# Patient Record
Sex: MALE | Employment: OTHER | URBAN - METROPOLITAN AREA
[De-identification: names, ages, dates, MRNs, and addresses within clinical notes are randomized per-mention and may not be internally consistent; named-entity substitution may affect disease eponyms.]

---

## 2024-08-06 ENCOUNTER — HOSPITAL ENCOUNTER (INPATIENT)
Age: 89
LOS: 1 days | Discharge: NURSING, REHAB OR LONG-TERM CARE | End: 2024-08-07
Attending: EMERGENCY MEDICINE | Admitting: SURGERY
Payer: MEDICARE

## 2024-08-06 ENCOUNTER — APPOINTMENT (OUTPATIENT)
Dept: CT IMAGING | Age: 89
End: 2024-08-06
Payer: MEDICARE

## 2024-08-06 ENCOUNTER — APPOINTMENT (OUTPATIENT)
Age: 89
End: 2024-08-06
Payer: MEDICARE

## 2024-08-06 DIAGNOSIS — I60.9 SUBARACHNOID HEMORRHAGE (HCC): ICD-10-CM

## 2024-08-06 DIAGNOSIS — I71.43 INFRARENAL ABDOMINAL AORTIC ANEURYSM (AAA) WITHOUT RUPTURE (HCC): ICD-10-CM

## 2024-08-06 DIAGNOSIS — I61.9 INTRAPARENCHYMAL HEMORRHAGE OF BRAIN (HCC): Primary | ICD-10-CM

## 2024-08-06 LAB
25(OH)D3 SERPL-MCNC: 12.1 NG/ML (ref 30–100)
ABO + RH BLD: NORMAL
ALBUMIN SERPL-MCNC: 4 G/DL (ref 3.5–5.2)
ANION GAP SERPL CALCULATED.3IONS-SCNC: 10 MMOL/L (ref 9–16)
ARM BAND NUMBER: NORMAL
BASOPHILS # BLD: 0.05 K/UL (ref 0–0.2)
BASOPHILS NFR BLD: 1 % (ref 0–2)
BLOOD BANK SAMPLE EXPIRATION: NORMAL
BLOOD BANK SPECIMEN: ABNORMAL
BLOOD GROUP ANTIBODIES SERPL: NEGATIVE
BODY TEMPERATURE: 37
BUN SERPL-MCNC: 20 MG/DL (ref 8–23)
CHLORIDE SERPL-SCNC: 100 MMOL/L (ref 98–107)
CK SERPL-CCNC: 92 U/L (ref 39–308)
CO2 SERPL-SCNC: 22 MMOL/L (ref 20–31)
COHGB MFR BLD: 2 % (ref 0–5)
CREAT SERPL-MCNC: 1.4 MG/DL (ref 0.7–1.2)
EOSINOPHIL # BLD: 0.22 K/UL (ref 0–0.44)
EOSINOPHILS RELATIVE PERCENT: 2 % (ref 1–4)
ERYTHROCYTE [DISTWIDTH] IN BLOOD BY AUTOMATED COUNT: 14.2 % (ref 11.8–14.4)
ERYTHROCYTE [DISTWIDTH] IN BLOOD BY AUTOMATED COUNT: 14.3 % (ref 11.8–14.4)
EST. AVERAGE GLUCOSE BLD GHB EST-MCNC: 108 MG/DL
ETHANOL PERCENT: <0.01 %
ETHANOLAMINE SERPL-MCNC: <10 MG/DL (ref 0–0.08)
FIBRINOGEN, FUNCTIONAL TEG: <4 MM (ref 15–32)
FIO2 ON VENT: ABNORMAL %
GFR, ESTIMATED: 48 ML/MIN/1.73M2
GLUCOSE SERPL-MCNC: 109 MG/DL (ref 74–99)
HBA1C MFR BLD: 5.4 % (ref 4–6)
HCO3 VENOUS: 23 MMOL/L (ref 24–30)
HCT VFR BLD AUTO: 36.2 % (ref 40.7–50.3)
HCT VFR BLD AUTO: 36.9 % (ref 40.7–50.3)
HGB BLD-MCNC: 10.7 G/DL (ref 13–17)
HGB BLD-MCNC: 11.7 G/DL (ref 13–17)
IMM GRANULOCYTES # BLD AUTO: 0.03 K/UL (ref 0–0.3)
IMM GRANULOCYTES NFR BLD: 0 %
INR PPP: 1
LY30 (LYSIS) TEG: 0.1 % (ref 0–2.6)
LYMPHOCYTES NFR BLD: 1.35 K/UL (ref 1.1–3.7)
LYMPHOCYTES RELATIVE PERCENT: 15 % (ref 24–43)
MA(MAX CLOT) RAPID TEG: <40 MM (ref 52–70)
MCH RBC QN AUTO: 28.8 PG (ref 25.2–33.5)
MCH RBC QN AUTO: 29 PG (ref 25.2–33.5)
MCHC RBC AUTO-ENTMCNC: 29.6 G/DL (ref 28.4–34.8)
MCHC RBC AUTO-ENTMCNC: 31.7 G/DL (ref 28.4–34.8)
MCV RBC AUTO: 90.9 FL (ref 82.6–102.9)
MCV RBC AUTO: 98.1 FL (ref 82.6–102.9)
MONOCYTES NFR BLD: 0.7 K/UL (ref 0.1–1.2)
MONOCYTES NFR BLD: 8 % (ref 3–12)
MYOGLOBIN SERPL-MCNC: 109 NG/ML (ref 28–72)
NEGATIVE BASE EXCESS, VEN: 1.4 MMOL/L (ref 0–2)
NEUTROPHILS NFR BLD: 75 % (ref 36–65)
NEUTS SEG NFR BLD: 6.95 K/UL (ref 1.5–8.1)
NRBC BLD-RTO: 0 PER 100 WBC
NRBC BLD-RTO: 0 PER 100 WBC
O2 SAT, VEN: 77.8 % (ref 60–85)
PARTIAL THROMBOPLASTIN TIME: 27.5 SEC (ref 23–36.5)
PCO2 VENOUS: 40.1 MM HG (ref 39–55)
PH VENOUS: 7.38 (ref 7.32–7.42)
PLATELET # BLD AUTO: 159 K/UL (ref 138–453)
PLATELET # BLD AUTO: 177 K/UL (ref 138–453)
PMV BLD AUTO: 8.8 FL (ref 8.1–13.5)
PMV BLD AUTO: 8.9 FL (ref 8.1–13.5)
PO2 VENOUS: 41.4 MM HG (ref 30–50)
POTASSIUM SERPL-SCNC: 5 MMOL/L (ref 3.7–5.3)
PROTHROMBIN TIME: 13.2 SEC (ref 11.7–14.9)
RBC # BLD AUTO: 3.69 M/UL (ref 4.21–5.77)
RBC # BLD AUTO: 4.06 M/UL (ref 4.21–5.77)
REACTION TIME TEG: 7.3 MIN (ref 4.6–9.1)
SODIUM SERPL-SCNC: 132 MMOL/L (ref 136–145)
T4 FREE SERPL-MCNC: 1.4 NG/DL (ref 0.92–1.68)
TROPONIN I SERPL HS-MCNC: 34 NG/L (ref 0–22)
TROPONIN I SERPL HS-MCNC: 39 NG/L (ref 0–22)
TSH SERPL DL<=0.05 MIU/L-ACNC: 1.64 UIU/ML (ref 0.27–4.2)
VIT B12 SERPL-MCNC: 415 PG/ML (ref 232–1245)
WBC OTHER # BLD: 10.6 K/UL (ref 3.5–11.3)
WBC OTHER # BLD: 9.3 K/UL (ref 3.5–11.3)

## 2024-08-06 PROCEDURE — 99222 1ST HOSP IP/OBS MODERATE 55: CPT | Performed by: SURGERY

## 2024-08-06 PROCEDURE — 83036 HEMOGLOBIN GLYCOSYLATED A1C: CPT

## 2024-08-06 PROCEDURE — 85027 COMPLETE CBC AUTOMATED: CPT

## 2024-08-06 PROCEDURE — 6370000000 HC RX 637 (ALT 250 FOR IP): Performed by: NURSE PRACTITIONER

## 2024-08-06 PROCEDURE — 2000000000 HC ICU R&B

## 2024-08-06 PROCEDURE — 71260 CT THORAX DX C+: CPT

## 2024-08-06 PROCEDURE — 82947 ASSAY GLUCOSE BLOOD QUANT: CPT

## 2024-08-06 PROCEDURE — 70450 CT HEAD/BRAIN W/O DYE: CPT

## 2024-08-06 PROCEDURE — 6360000002 HC RX W HCPCS: Performed by: STUDENT IN AN ORGANIZED HEALTH CARE EDUCATION/TRAINING PROGRAM

## 2024-08-06 PROCEDURE — 84443 ASSAY THYROID STIM HORMONE: CPT

## 2024-08-06 PROCEDURE — 6360000004 HC RX CONTRAST MEDICATION

## 2024-08-06 PROCEDURE — 85390 FIBRINOLYSINS SCREEN I&R: CPT

## 2024-08-06 PROCEDURE — 36430 TRANSFUSION BLD/BLD COMPNT: CPT

## 2024-08-06 PROCEDURE — 82306 VITAMIN D 25 HYDROXY: CPT

## 2024-08-06 PROCEDURE — 85384 FIBRINOGEN ACTIVITY: CPT

## 2024-08-06 PROCEDURE — 84439 ASSAY OF FREE THYROXINE: CPT

## 2024-08-06 PROCEDURE — 86901 BLOOD TYPING SEROLOGIC RH(D): CPT

## 2024-08-06 PROCEDURE — 86850 RBC ANTIBODY SCREEN: CPT

## 2024-08-06 PROCEDURE — 82805 BLOOD GASES W/O2 SATURATION: CPT

## 2024-08-06 PROCEDURE — P9073 PLATELETS PHERESIS PATH REDU: HCPCS

## 2024-08-06 PROCEDURE — APPSS45 APP SPLIT SHARED TIME 31-45 MINUTES: Performed by: REGISTERED NURSE

## 2024-08-06 PROCEDURE — 85576 BLOOD PLATELET AGGREGATION: CPT

## 2024-08-06 PROCEDURE — 82565 ASSAY OF CREATININE: CPT

## 2024-08-06 PROCEDURE — 84484 ASSAY OF TROPONIN QUANT: CPT

## 2024-08-06 PROCEDURE — 85610 PROTHROMBIN TIME: CPT

## 2024-08-06 PROCEDURE — 85025 COMPLETE CBC W/AUTO DIFF WBC: CPT

## 2024-08-06 PROCEDURE — 82607 VITAMIN B-12: CPT

## 2024-08-06 PROCEDURE — 85347 COAGULATION TIME ACTIVATED: CPT

## 2024-08-06 PROCEDURE — 2580000003 HC RX 258: Performed by: STUDENT IN AN ORGANIZED HEALTH CARE EDUCATION/TRAINING PROGRAM

## 2024-08-06 PROCEDURE — 84520 ASSAY OF UREA NITROGEN: CPT

## 2024-08-06 PROCEDURE — 86900 BLOOD TYPING SEROLOGIC ABO: CPT

## 2024-08-06 PROCEDURE — G0480 DRUG TEST DEF 1-7 CLASSES: HCPCS

## 2024-08-06 PROCEDURE — 84703 CHORIONIC GONADOTROPIN ASSAY: CPT

## 2024-08-06 PROCEDURE — 6370000000 HC RX 637 (ALT 250 FOR IP): Performed by: STUDENT IN AN ORGANIZED HEALTH CARE EDUCATION/TRAINING PROGRAM

## 2024-08-06 PROCEDURE — 94761 N-INVAS EAR/PLS OXIMETRY MLT: CPT

## 2024-08-06 PROCEDURE — 36415 COLL VENOUS BLD VENIPUNCTURE: CPT

## 2024-08-06 PROCEDURE — 85730 THROMBOPLASTIN TIME PARTIAL: CPT

## 2024-08-06 PROCEDURE — 80051 ELECTROLYTE PANEL: CPT

## 2024-08-06 PROCEDURE — 82040 ASSAY OF SERUM ALBUMIN: CPT

## 2024-08-06 PROCEDURE — 82550 ASSAY OF CK (CPK): CPT

## 2024-08-06 PROCEDURE — 99285 EMERGENCY DEPT VISIT HI MDM: CPT

## 2024-08-06 PROCEDURE — 83874 ASSAY OF MYOGLOBIN: CPT

## 2024-08-06 RX ORDER — FERROUS SULFATE 325(65) MG
325 TABLET ORAL
COMMUNITY

## 2024-08-06 RX ORDER — SODIUM CHLORIDE 9 MG/ML
INJECTION, SOLUTION INTRAVENOUS PRN
Status: DISCONTINUED | OUTPATIENT
Start: 2024-08-06 | End: 2024-08-07 | Stop reason: HOSPADM

## 2024-08-06 RX ORDER — SODIUM CHLORIDE 9 MG/ML
INJECTION, SOLUTION INTRAVENOUS CONTINUOUS
Status: DISCONTINUED | OUTPATIENT
Start: 2024-08-06 | End: 2024-08-06

## 2024-08-06 RX ORDER — BACILLUS COAGULANS 1B CELL
500 CAPSULE ORAL DAILY
COMMUNITY

## 2024-08-06 RX ORDER — NYSTATIN 100000 U/G
CREAM TOPICAL 2 TIMES DAILY
COMMUNITY

## 2024-08-06 RX ORDER — SODIUM CHLORIDE 0.9 % (FLUSH) 0.9 %
5-40 SYRINGE (ML) INJECTION EVERY 12 HOURS SCHEDULED
Status: DISCONTINUED | OUTPATIENT
Start: 2024-08-06 | End: 2024-08-07 | Stop reason: HOSPADM

## 2024-08-06 RX ORDER — ACETAMINOPHEN 500 MG
1000 TABLET ORAL EVERY 8 HOURS SCHEDULED
Status: DISCONTINUED | OUTPATIENT
Start: 2024-08-06 | End: 2024-08-07 | Stop reason: HOSPADM

## 2024-08-06 RX ORDER — POTASSIUM CHLORIDE 29.8 MG/ML
20 INJECTION INTRAVENOUS PRN
Status: DISCONTINUED | OUTPATIENT
Start: 2024-08-06 | End: 2024-08-07 | Stop reason: HOSPADM

## 2024-08-06 RX ORDER — POTASSIUM CHLORIDE 7.45 MG/ML
10 INJECTION INTRAVENOUS PRN
Status: DISCONTINUED | OUTPATIENT
Start: 2024-08-06 | End: 2024-08-07 | Stop reason: HOSPADM

## 2024-08-06 RX ORDER — ACETAMINOPHEN 500 MG
1000 TABLET ORAL EVERY 6 HOURS PRN
COMMUNITY

## 2024-08-06 RX ORDER — TROSPIUM CHLORIDE 20 MG/1
20 TABLET, FILM COATED ORAL NIGHTLY
Status: DISCONTINUED | OUTPATIENT
Start: 2024-08-06 | End: 2024-08-07 | Stop reason: HOSPADM

## 2024-08-06 RX ORDER — POLYETHYLENE GLYCOL 3350 17 G/17G
17 POWDER, FOR SOLUTION ORAL DAILY
Status: DISCONTINUED | OUTPATIENT
Start: 2024-08-06 | End: 2024-08-07 | Stop reason: HOSPADM

## 2024-08-06 RX ORDER — ONDANSETRON 2 MG/ML
4 INJECTION INTRAMUSCULAR; INTRAVENOUS EVERY 6 HOURS PRN
Status: DISCONTINUED | OUTPATIENT
Start: 2024-08-06 | End: 2024-08-07 | Stop reason: HOSPADM

## 2024-08-06 RX ORDER — TOLTERODINE 4 MG/1
4 CAPSULE, EXTENDED RELEASE ORAL DAILY
COMMUNITY

## 2024-08-06 RX ORDER — MEMANTINE HYDROCHLORIDE 5 MG/1
5 TABLET ORAL DAILY
COMMUNITY
Start: 2024-06-07

## 2024-08-06 RX ORDER — SODIUM CHLORIDE 0.9 % (FLUSH) 0.9 %
5-40 SYRINGE (ML) INJECTION PRN
Status: DISCONTINUED | OUTPATIENT
Start: 2024-08-06 | End: 2024-08-07 | Stop reason: HOSPADM

## 2024-08-06 RX ORDER — ATORVASTATIN CALCIUM 20 MG/1
20 TABLET, FILM COATED ORAL DAILY
Status: DISCONTINUED | OUTPATIENT
Start: 2024-08-06 | End: 2024-08-07 | Stop reason: HOSPADM

## 2024-08-06 RX ORDER — LORATADINE 10 MG/1
10 TABLET ORAL DAILY
COMMUNITY

## 2024-08-06 RX ORDER — LISINOPRIL 10 MG/1
10 TABLET ORAL DAILY
COMMUNITY

## 2024-08-06 RX ORDER — LANOLIN ALCOHOL/MO/W.PET/CERES
250 CREAM (GRAM) TOPICAL NIGHTLY
COMMUNITY

## 2024-08-06 RX ORDER — ATORVASTATIN CALCIUM 20 MG/1
20 TABLET, FILM COATED ORAL DAILY
COMMUNITY

## 2024-08-06 RX ORDER — CLOPIDOGREL BISULFATE 75 MG/1
75 TABLET ORAL DAILY
Status: ON HOLD | COMMUNITY
End: 2024-08-07 | Stop reason: HOSPADM

## 2024-08-06 RX ORDER — SENNA AND DOCUSATE SODIUM 50; 8.6 MG/1; MG/1
1 TABLET, FILM COATED ORAL 2 TIMES DAILY
Status: DISCONTINUED | OUTPATIENT
Start: 2024-08-06 | End: 2024-08-07 | Stop reason: HOSPADM

## 2024-08-06 RX ORDER — ONDANSETRON 4 MG/1
4 TABLET, ORALLY DISINTEGRATING ORAL EVERY 8 HOURS PRN
Status: DISCONTINUED | OUTPATIENT
Start: 2024-08-06 | End: 2024-08-07 | Stop reason: HOSPADM

## 2024-08-06 RX ORDER — BISACODYL 10 MG
10 SUPPOSITORY, RECTAL RECTAL DAILY PRN
Status: DISCONTINUED | OUTPATIENT
Start: 2024-08-06 | End: 2024-08-07 | Stop reason: HOSPADM

## 2024-08-06 RX ORDER — OXYCODONE HYDROCHLORIDE 5 MG/1
2.5 TABLET ORAL EVERY 6 HOURS PRN
Status: DISCONTINUED | OUTPATIENT
Start: 2024-08-06 | End: 2024-08-07 | Stop reason: HOSPADM

## 2024-08-06 RX ORDER — OLOPATADINE HYDROCHLORIDE 1 MG/ML
3 SOLUTION/ DROPS OPHTHALMIC 2 TIMES DAILY PRN
COMMUNITY

## 2024-08-06 RX ORDER — TRAZODONE HYDROCHLORIDE 50 MG/1
50 TABLET ORAL NIGHTLY
COMMUNITY

## 2024-08-06 RX ORDER — MAGNESIUM SULFATE IN WATER 40 MG/ML
2000 INJECTION, SOLUTION INTRAVENOUS PRN
Status: DISCONTINUED | OUTPATIENT
Start: 2024-08-06 | End: 2024-08-07 | Stop reason: HOSPADM

## 2024-08-06 RX ORDER — IBUPROFEN 200 MG
200 TABLET ORAL EVERY 8 HOURS PRN
COMMUNITY

## 2024-08-06 RX ORDER — LEVETIRACETAM 500 MG/5ML
1000 INJECTION, SOLUTION, CONCENTRATE INTRAVENOUS ONCE
Status: COMPLETED | OUTPATIENT
Start: 2024-08-06 | End: 2024-08-06

## 2024-08-06 RX ORDER — DEXTROMETHORPHAN POLISTIREX 30 MG/5ML
30 SUSPENSION ORAL 2 TIMES DAILY PRN
COMMUNITY

## 2024-08-06 RX ORDER — MEMANTINE HYDROCHLORIDE 5 MG/1
5 TABLET ORAL DAILY
Status: DISCONTINUED | OUTPATIENT
Start: 2024-08-06 | End: 2024-08-07 | Stop reason: HOSPADM

## 2024-08-06 RX ORDER — LEVETIRACETAM 500 MG/1
500 TABLET ORAL 2 TIMES DAILY
Status: DISCONTINUED | OUTPATIENT
Start: 2024-08-06 | End: 2024-08-07 | Stop reason: HOSPADM

## 2024-08-06 RX ADMIN — SODIUM CHLORIDE, PRESERVATIVE FREE 5 ML: 5 INJECTION INTRAVENOUS at 11:44

## 2024-08-06 RX ADMIN — TROSPIUM CHLORIDE 20 MG: 20 TABLET, FILM COATED ORAL at 21:05

## 2024-08-06 RX ADMIN — SENNOSIDES AND DOCUSATE SODIUM 1 TABLET: 50; 8.6 TABLET ORAL at 21:05

## 2024-08-06 RX ADMIN — ATORVASTATIN CALCIUM 20 MG: 20 TABLET, FILM COATED ORAL at 13:28

## 2024-08-06 RX ADMIN — IOPAMIDOL 130 ML: 755 INJECTION, SOLUTION INTRAVENOUS at 09:02

## 2024-08-06 RX ADMIN — SODIUM CHLORIDE, PRESERVATIVE FREE 10 ML: 5 INJECTION INTRAVENOUS at 21:05

## 2024-08-06 RX ADMIN — SODIUM CHLORIDE: 9 INJECTION, SOLUTION INTRAVENOUS at 11:44

## 2024-08-06 RX ADMIN — MEMANTINE 5 MG: 5 TABLET ORAL at 15:30

## 2024-08-06 RX ADMIN — LEVETIRACETAM 1000 MG: 100 INJECTION INTRAVENOUS at 11:45

## 2024-08-06 RX ADMIN — LEVETIRACETAM 500 MG: 500 TABLET, FILM COATED ORAL at 21:05

## 2024-08-06 ASSESSMENT — ENCOUNTER SYMPTOMS
COUGH: 0
VOMITING: 0
SINUS PAIN: 0
ABDOMINAL PAIN: 0
EYE PAIN: 0
NAUSEA: 0
EYE DISCHARGE: 0
BACK PAIN: 0
SHORTNESS OF BREATH: 0

## 2024-08-06 ASSESSMENT — PAIN SCALES - GENERAL: PAINLEVEL_OUTOF10: 0

## 2024-08-06 ASSESSMENT — LIFESTYLE VARIABLES
HOW MANY STANDARD DRINKS CONTAINING ALCOHOL DO YOU HAVE ON A TYPICAL DAY: PATIENT DOES NOT DRINK
HOW OFTEN DO YOU HAVE A DRINK CONTAINING ALCOHOL: NEVER

## 2024-08-06 NOTE — H&P
TRAUMA H&P/CONSULT    PATIENT NAME: Timothy Cordova  YOB: 1931  MEDICAL RECORD NO. [id number]   DATE: 8/6/2024  PRIMARY CARE PHYSICIAN: No primary care provider on file.  PATIENT EVALUATED AT THE REQUEST OF : La MCKENNA   Trauma Priority      IMPRESSION AND PLAN:       Diagnosis: Acute intracranial hemorrhage, SDH, SAH, BIG 3  Plan:   -Admit to TICU  -Neurosurgery consult, appreciate recommendations  -Neuro checks per protocol   -NPO      If intracranial hemorrhage is present, is it a:  [] BIG 1  [] BIG 2  [x] BIG 3  If chest wall injury: Rib score___    CONSULT SERVICES    Neurosurgery      HISTORY:     Chief Complaint:  \"My head hurts\"    GENERAL DATA  Patient information was obtained from patient and EMS personnel.  History/Exam limitations: none.  Injury Date: 8/6/24   Approximate Injury Time: 0400        Transport mode: Ambulance  Referring Hospital: [facility]    SETTING OF TRAUMATIC EVENT   Location : Home  Specific Details of Location: Bathroom    MECHANISM OF INJURY    Fall From standing      HISTORY:     Timothy Cordova is a male that presented to the Emergency Department as a transfer from H for evaluation of SDH, SAH. Pt reports he slipped and fell in the shower this morning. Denies LOC. On aspirin and plavix. Pt unable to recall why he takes these medications.     Traumatic loss of Consciousness: No    Total Fluids Given Prior To Arrival  mL    MEDICATIONS:   []  None     []  Information not available due to exam limitations documented above    Prior to Admission medications    Not on File       ALLERGIES:   []  None    []   Information not available due to exam limitations documented above     Patient has no known allergies.    PAST MEDICAL/SURGICAL HISTORY: []  None   []   Information not available due to exam limitations documented above      has no past medical history on file.  has no past surgical history on file.    FAMILY HISTORY   []   Information not available  due to exam limitations documented above    family history is not on file.    SOCIAL HISTORY  []   Information not available due to exam limitations documented above     has no history on file for tobacco use.   has no history on file for alcohol use.   has no history on file for drug use.    Review of Systems:    Review of Systems   Constitutional:  Negative for chills and fever.   HENT:  Negative for dental problem and sinus pain.    Eyes:  Negative for pain and discharge.   Respiratory:  Negative for cough and shortness of breath.    Cardiovascular:  Negative for chest pain and palpitations.   Gastrointestinal:  Negative for abdominal pain, nausea and vomiting.   Genitourinary:  Negative for difficulty urinating, penile pain, scrotal swelling and testicular pain.   Musculoskeletal:  Negative for back pain and neck pain.   Skin:  Positive for wound. Negative for pallor.   Neurological:  Negative for seizures, light-headedness and headaches.           PHYSICAL EXAMINATION:     VITAL SIGNS:   Vitals:    08/06/24 0854   BP:    Pulse:    Resp:    Temp: 98.1 °F (36.7 °C)   SpO2:        Physical Exam  Vitals reviewed.   Constitutional:       General: He is not in acute distress.     Appearance: He is not ill-appearing or toxic-appearing.   HENT:      Head: Normocephalic.      Right Ear: Tympanic membrane and external ear normal.      Left Ear: Tympanic membrane and external ear normal.      Mouth/Throat:      Mouth: Mucous membranes are moist.   Eyes:      Extraocular Movements: Extraocular movements intact.      Conjunctiva/sclera: Conjunctivae normal.      Pupils: Pupils are equal, round, and reactive to light.   Cardiovascular:      Rate and Rhythm: Normal rate and regular rhythm.   Pulmonary:      Effort: Pulmonary effort is normal. No respiratory distress.   Abdominal:      General: There is no distension.      Palpations: Abdomen is soft.      Tenderness: There is no abdominal tenderness.   Musculoskeletal:

## 2024-08-06 NOTE — ED PROVIDER NOTES
.    [facility] ED     Emergency Department     Faculty Attestation    I performed a history and physical examination of the patient and discussed management with the resident. I reviewed the resident’s note and agree with the documented findings and plan of care. Any areas of disagreement are noted on the chart. I was personally present for the key portions of any procedures. I have documented in the chart those procedures where I was not present during the key portions. I have reviewed the emergency nurses triage note. I agree with the chief complaint, past medical history, past surgical history, allergies, medications, social and family history as documented unless otherwise noted below. For Physician Assistant/ Nurse Practitioner cases/documentation I have personally evaluated this patient and have completed at least one if not all key elements of the E/M (history, physical exam, and MDM). Additional findings are as noted.    Note Started: 8:41 AM EDT    Patient transferred from [facility] for subarachnoid intracranial hemorrhage status post fall.  Per report independent history from transport agency and transferring physician slip and fall at his care facility this morning.  CT showed above on arrival awake alert and oriented GCS 15 normal primary survey trauma team at bedside      Critical Care     CRITICAL CARE: There was a high probability of clinically significant/life threatening deterioration in this patient's condition which required my urgent intervention.  Total critical care time was 10 minutes.  This excludes any time for separately reportable procedures.       German Tovar MD, FACEP, FAAEM  Attending Emergency  Physician           German Tovar MD  08/06/24 1035

## 2024-08-06 NOTE — ED PROVIDER NOTES
[facility]- SICU  Emergency Department Encounter  Emergency Medicine Resident     Pt Name:Timothy Cordova  MRN: [id number]  Birthdate 2/12/1931  Date of evaluation: 8/6/24  PCP:  Shayan Purdy MD  Note Started: 8:55 AM EDT      CHIEF COMPLAINT       Chief Complaint   Patient presents with    Fall       HISTORY OF PRESENT ILLNESS  (Location/Symptom, Timing/Onset, Context/Setting, Quality, Duration, Modifying Factors, Severity.)      Timothy Cordova is a 89 y.o. male who presents with fall while standing in the shower today.  Patient states he slipped and fell in the shower in assisted living while bending down to  soap that he dropped  Patient is on aspirin and Plavix.  Found to have intracranial bleed at outlying facility.    Hx CABG    PAST MEDICAL / SURGICAL / SOCIAL / FAMILY HISTORY      has no past medical history on file.       has a past surgical history that includes Coronary artery bypass graft.      Social History     Socioeconomic History    Marital status:      Spouse name: Not on file    Number of children: Not on file    Years of education: Not on file    Highest education level: Not on file   Occupational History    Not on file   Tobacco Use    Smoking status: Former     Current packs/day: 0.00     Types: Cigarettes     Quit date: 8/1/2014     Years since quitting: 10.0    Smokeless tobacco: Never   Vaping Use    Vaping Use: Never used   Substance and Sexual Activity    Alcohol use: Not Currently    Drug use: Not on file    Sexual activity: Defer   Other Topics Concern    Not on file   Social History Narrative    Not on file     Social Determinants of Health     Financial Resource Strain: Not on file   Food Insecurity: No Food Insecurity (8/6/2024)    Hunger Vital Sign     Worried About Running Out of Food in the Last Year: Never true     Ran Out of Food in the Last Year: Never true   Transportation Needs: No Transportation Needs (8/6/2024)    PRAPARE - Transportation     Lack of  hours as needed for Pain As needed for tylenol ineffective   Yes Shelly Davis MD   nystatin (MYCOSTATIN) 414712 UNIT/GM cream Apply topically 2 times daily Apply topically 2 times daily as needed for redness in groin   Yes Shelly Davis MD   olopatadine (PATANOL) 0.1 % ophthalmic solution Place 3 drops into both eyes 2 times daily as needed for Allergies (Itchy eyes) 3 drops in each eye two times a day as needed for itchy eyes   Yes Shelly Davis MD   white petrolatum OINT ointment Apply topically 2 times daily as needed (Apply to nostriol topically as needed for nosebleeds)   Yes Shelly Davis MD   sodium chloride (OCEAN, BABY AYR) 0.65 % nasal spray 2 sprays by Nasal route as needed for Congestion   Yes Shelly Davis MD   atorvastatin (LIPITOR) 20 MG tablet Take 1 tablet by mouth daily    Shelly Davis MD   lisinopril (PRINIVIL;ZESTRIL) 10 MG tablet Take 1 tablet by mouth daily    Shelly Davis MD   tolterodine (DETROL LA) 4 MG extended release capsule Take 1 capsule by mouth daily    Shelly Davis MD         PHYSICAL EXAM      INITIAL VITALS:   BP (!) 136/58   Pulse 52   Temp 98 °F (36.7 °C) (Oral)   Resp 19   Ht 1.727 m (5' 8\")   Wt 70.9 kg (156 lb 6.4 oz)   SpO2 95%   BMI 23.78 kg/m²     Physical Exam  Vitals reviewed.   Constitutional:       General: He is not in acute distress.  HENT:      Head: Normocephalic.      Comments: Laceration to posterior head with staples in place, dried blood in left naris, edentulous, no dental trauma  Eyes:      Extraocular Movements: Extraocular movements intact.      Pupils: Pupils are equal, round, and reactive to light.   Cardiovascular:      Rate and Rhythm: Regular rhythm. Bradycardia present.      Pulses: Normal pulses.   Pulmonary:      Effort: Pulmonary effort is normal.   Abdominal:      Palpations: Abdomen is soft.      Tenderness: There is no abdominal tenderness.   Musculoskeletal:

## 2024-08-06 NOTE — CONSENT
Informed Consent for Blood Component Transfusion Note    I have discussed with the patient the rationale for blood component transfusion; its benefits in treating or preventing fatigue, organ damage, or death; and its risk which includes mild transfusion reactions, rare risk of blood borne infection, or more serious but rare reactions. I have discussed the alternatives to transfusion, including the risk and consequences of not receiving transfusion. The patient had an opportunity to ask questions and had agreed to proceed with transfusion of blood components.    Electronically signed by Neena Johns MD on 8/6/24 at 5:48 PM EDT

## 2024-08-06 NOTE — CONSULTS
Department of Neurosurgery                                            Nurse Practitioner Consult Note      Reason for Consult:  trauma, SDH, SAH   Requesting Physician:  Jojo Kate DO   Neurosurgeon:   [] Dr. Jenkins  [] Dr. Carter  [] Dr. Gallo  [x] Dr. Gallegos      History Obtained From:  patient, electronic medical record    CHIEF COMPLAINT:         Chief Complaint   Patient presents with    Fall       HISTORY OF PRESENT ILLNESS:       The patient is a 89 y.o. male transferred from [facility] where he presented after slipping and falling in the shower at his assisted living facility. Denies any pain. Denies headache. Denies nausea.    GCS 15 with no weakness.   Takes ASA and Plavix due to previous heart surgery.       Neurosurgery notified of consult at: 0859  Neurosurgery evaluation begun: 0910    PAST MEDICAL HISTORY :       Past Medical History:    No past medical history on file.    Past Surgical History:    No past surgical history on file.    Social History:   Social History     Socioeconomic History    Marital status: Not on file     Spouse name: Not on file    Number of children: Not on file    Years of education: Not on file    Highest education level: Not on file   Occupational History    Not on file   Tobacco Use    Smoking status: Not on file    Smokeless tobacco: Not on file   Substance and Sexual Activity    Alcohol use: Not on file    Drug use: Not on file    Sexual activity: Not on file   Other Topics Concern    Not on file   Social History Narrative    Not on file     Social Determinants of Health     Financial Resource Strain: Not on file   Food Insecurity: Not on file   Transportation Needs: Not on file   Physical Activity: Not on file   Stress: Not on file   Social Connections: Not on file   Intimate Partner Violence: Not on file   Housing Stability: Not on file       Family History:   No family history on file.    Allergies:  Patient has no known

## 2024-08-06 NOTE — PROGRESS NOTES
[facility]     Emergency/Trauma Note    PATIENT NAME: Timothy Cordova    Shift date: 8/6/2024   Shift day: Tuesday   Shift # 1    Room # 16/16   Name: Timothy Cordova            Age: 89 y.o.  Gender: male          Latter day: Confucianist   Place of Church:     Trauma/Incident type: Adult Trauma Priority  Admit Date & Time: 8/6/2024  8:39 AM    PATIENT/EVENT DESCRIPTION:  Timothy Cordova is a 89 y.o. male who arrived as a transfer from [facility].  Pt fell at the facility in which he lives. Pt to be admitted to 16/16.         SPIRITUAL ASSESSMENT-INTERVENTION-OUTCOME:  Pt appeared calm and to be coping. He said he was cold. Writer got him a warm blanket. Writer escorted pt's daughter and her  to pt's room.  Dtr appeared calm. They said they are expecting other visitors to arrive later. Writer provided a supportive presence.      PATIENT BELONGINGS:  With patient    ANY BELONGINGS OF SIGNIFICANT VALUE NOTED:      REGISTRATION STAFF NOTIFIED?  No      WHAT IS YOUR SPIRITUAL CARE PLAN FOR THIS PATIENT?:   Spiritual health team will remain available for spiritual and emotional support.     Electronically signed by Chaplain Cristy, on 8/6/2024 at 10:05 AM.  [facility]  [phone]

## 2024-08-06 NOTE — ED NOTES
0739  Tmiothy Cordova 89 yom  Independent living ECF  Slipped on soap in shower, hit head  On ASA Plavix  CT right frontal contusion with SAH, possible SDH as well  A+Ox4, no deficits  Ground

## 2024-08-06 NOTE — H&P
ICU PROGRESS NOTE      PATIENT NAME: Timothy Cordova  MEDICAL RECORD NO. [id number]  DATE: 2024    HD: # 0    Chief Complaint: Fall from standing height in shower, on ASA and plavix     SUBJECTIVE    Timothy Cordova is a 89 year old male who presented with a fall from standing height in the shower at his assisted living. Patient is on ASA and plavix for history of CABG. Found to have a right frontal SAH/intraparenchymal hemorrhage and small left SDH. Admitted to the TICU for close monitoring, BIG 3.       2024: TICU admission, repeat CT head, trend troponin, TEG       MEDICAL DECISION MAKING/PLAN  Neuro:  right frontal SAH/intraparenchymal hemorrhage and small left SDH  Neurosurgery consulted   Repeat CT head at 3pm   GCS 15  Resume home memantine  Start Keppra   Tylenol for pain, jas for pain   CV  History of CABG, hold ASA and plavix secondary to SDH and SAH  On lisinopril, will hold due to elevated crt, prn hydralazine or labetalol for hypertension   Resume Lipitor   Troponin 39, repeat troponin ordered.   Pulm  On room air  GI/Nutrition  NPO pending repeat CT Head   Diet NPO Exceptions are: Sips of Water with Meds    Renal/lytes  UOP :   Fluids: NS at 125cc/hr   Resume home trospium for overactive bladder     Intake/Output Summary (Last 24 hours) at 2024 1210  Last data filed at 2024 1144  Gross per 24 hour   Intake 305 ml   Output 100 ml   Net 205 ml       Recent Labs     24  0854   *   K 5.0      CO2 22   BUN 20   CREATININE 1.4*     Heme  Plt 129  Check TEG     Recent Labs     24  0854   HGB 11.7*   HCT 36.9*       Endocrine  Monitor - will start HDSS if greater than 180    Recent Labs     24  0854   GLUCOSE 109*     Musculoskeletal  WBAT    Micro/ID   Afebrile,   Temp (24hrs), Av °F (36.7 °C), Min:97.7 °F (36.5 °C), Max:98.2 °F (36.8 °C)    Recent Labs     24  0854   WBC 10.6     Family/dispo  Family at bedside  Continue ICU care     Lines  PIV    1. No acute traumatic injury of the chest, abdomen or pelvis.   2. Bilobed fusiform infrarenal abdominal aortic aneurysm or 2 adjacent   aneurysms. The upper aneurysm measures 3.4 x 3.2 cm and the inferior aneurysm   measures 4.0 x 3.6 cm. There is partial thrombosis of the lumen. Recommend   follow-up every 12 months. Also recommend vascular consultation.   3. Ectasia of the descending thoracic aorta measuring 4 cm diameter with   normal size portion 3 cm diameter. Lumen is partially thrombosed along the   left side.   4. Calcified pleural plaques at lung bases.   5. Minor subsegmental atelectasis lung bases and along the major fissures in   the upper lobes.   CT THORACIC SPINE:      No acute osseous abnormality.      Degenerative changes.      CT LUMBAR SPINE:      No acute osseous abnormality.      Degenerative changes.         CT LUMBAR SPINE BONY RECONSTRUCTION   Final Result   CT CHEST ABDOMEN PELVIS:      1. No acute traumatic injury of the chest, abdomen or pelvis.   2. Bilobed fusiform infrarenal abdominal aortic aneurysm or 2 adjacent   aneurysms. The upper aneurysm measures 3.4 x 3.2 cm and the inferior aneurysm   measures 4.0 x 3.6 cm. There is partial thrombosis of the lumen. Recommend   follow-up every 12 months. Also recommend vascular consultation.   3. Ectasia of the descending thoracic aorta measuring 4 cm diameter with   normal size portion 3 cm diameter. Lumen is partially thrombosed along the   left side.   4. Calcified pleural plaques at lung bases.   5. Minor subsegmental atelectasis lung bases and along the major fissures in   the upper lobes.   CT THORACIC SPINE:      No acute osseous abnormality.      Degenerative changes.      CT LUMBAR SPINE:      No acute osseous abnormality.      Degenerative changes.         CT CHEST ABDOMEN PELVIS W CONTRAST Additional Contrast? None   Final Result   CT CHEST ABDOMEN PELVIS:      1. No acute traumatic injury of the chest, abdomen or pelvis.   2.

## 2024-08-06 NOTE — CARE COORDINATION
Case Management Assessment  Initial Evaluation    Date/Time of Evaluation: 8/6/2024 2:59 PM  Assessment Completed by: EDIS RIVERO RN    If patient is discharged prior to next notation, then this note serves as note for discharge by case management.    Patient Name: Timothy Cordova                   YOB: 1931  Diagnosis: SAH (subarachnoid hemorrhage) (HCC) [I60.9]                   Date / Time: 8/6/2024  8:39 AM    Patient Admission Status: Inpatient   Readmission Risk (Low < 19, Mod (19-27), High > 27): Readmission Risk Score: 11.9    Current PCP: Shayan Purdy MD  PCP verified by CM? (P) Yes    Chart Reviewed: Yes      History Provided by: (P) Child/Family  Patient Orientation: (P) Alert and Oriented    Patient Cognition: (P) Alert    Hospitalization in the last 30 days (Readmission):  No    If yes, Readmission Assessment in CM Navigator will be completed.    Advance Directives:      Code Status: Full Code   Patient's Primary Decision Maker is: (P) Legal Next of Kin      Discharge Planning:    Patient lives with: (P) Alone Type of Home: (P) Assisted living  Primary Care Giver: (P) Self  Patient Support Systems include: (P) Children, Other (Comment) (Assisted Living Staff)   Current Financial resources: (P) Medicare  Current community resources:    Current services prior to admission: (P) Other (Comment) (Assisted Living, [facility], assists with meds, cooking, cleaning and shopping.)            Current DME:              Type of Home Care services:  (P) None    ADLS  Prior functional level: (P) Assistance with the following:, Cooking, Housework, Shopping  Current functional level: (P) Assistance with the following:, Cooking, Housework, Shopping    PT AM-PAC:   /24  OT AM-PAC:   /24    Family can provide assistance at DC: (P) Yes  Would you like Case Management to discuss the discharge plan with any other family members/significant others, and if so, who? (P) Yes (Children)  Plans to Return to

## 2024-08-06 NOTE — ED NOTES
Mother here with sibling  Felipe has had a persistent cough for past 2 months  No longer appears to have significant rhinorrhea/ nasal discharge.  Cough exacerbates with exercise and exertion  Present some nights but not every night    Was seen in ED last week with significant reaction (urticaria/facial swelling)  Had shrimp and advocado the night prior.  Has appt with Allergy clinic but not until April    Will start trial of Albuterol MDI 2 puffs every 4-6 hrs for cough or 20 mins prior to exertion.  Aerochamber also provided and demonstrated use in clinic.    Mother will update us in 2 weeks with effectiveness of albuterol   Pt arrived to ED from [facility] via [facility]  Pt is reported to have a SAH & SDH after slipping and falling in the hower.   Pt is reported to take plavix & ASA  No deficits reported PTA,   No C-collar on arrival, C-spine cleared at [facility], no tenderness to neck   Pt A&OX4, RR even/unlabored

## 2024-08-06 NOTE — PROGRESS NOTES
15 Variable Trauma-Specific Frailty Index   Comorbidities   Cancer History Yes (1) No (0)   Coronary Heart Disease MI (1) CABG (0.75) Mild (0.25)  No (0)   Dementia Severe (1) Moderate (0.5) Mild (0.25)  No (0)   Daily Activities   Help With Grooming Yes (1) No (0)   Help with Managing Money Yes (1) No (0)   Help doing Housework Yes (1) No (0)   Help with Toileting Yes (1) No (0)   Help Walking Wheelchair (1) Walker (0.75) Cane (0.5) No (0)   Health Attitude   Feel Less Useful Most Time (1) Sometimes (0.5) Never (0)   Feel Sad Most Time (1) Sometimes (0.5) Never (0)   Feel Effort to Do Everything Most Time (1) Sometimes (0.5) Never (0)   Feels Lonely Most Time (1) Sometimes (0.5) Never (0)   Falls Most Time (1) Sometimes (0.5) Never (0)   Function   Sexually Active Yes (0)  No(1)   Nutrition   Albumin < 3g/dL (1)  > 3g/dL   Scoring   Score   FI (Score/15)  6.5/15 > 0.25 = Frail   *Based on 2-weeks prior to hospital admission   Trauma Specific Fraility Index > or = to 4 (4/15 = 0.26)  Trauma Specific Fraility Index Score:    0.43

## 2024-08-07 VITALS
OXYGEN SATURATION: 95 % | HEIGHT: 68 IN | SYSTOLIC BLOOD PRESSURE: 123 MMHG | DIASTOLIC BLOOD PRESSURE: 61 MMHG | RESPIRATION RATE: 16 BRPM | HEART RATE: 61 BPM | WEIGHT: 153.1 LBS | TEMPERATURE: 98.6 F | BODY MASS INDEX: 23.2 KG/M2

## 2024-08-07 PROBLEM — I61.9 INTRAPARENCHYMAL HEMORRHAGE OF BRAIN (HCC): Status: ACTIVE | Noted: 2024-08-07

## 2024-08-07 LAB
ANION GAP SERPL CALCULATED.3IONS-SCNC: 9 MMOL/L (ref 9–16)
BASOPHILS # BLD: 0.06 K/UL (ref 0–0.2)
BASOPHILS NFR BLD: 1 % (ref 0–2)
BLOOD BANK BLOOD PRODUCT EXPIRATION DATE: NORMAL
BLOOD BANK DISPENSE STATUS: NORMAL
BLOOD BANK ISBT PRODUCT BLOOD TYPE: 7300
BLOOD BANK PRODUCT CODE: NORMAL
BLOOD BANK UNIT TYPE AND RH: NORMAL
BPU ID: NORMAL
BUN SERPL-MCNC: 16 MG/DL (ref 8–23)
CA-I BLD-SCNC: 1.05 MMOL/L (ref 1.13–1.33)
CALCIUM SERPL-MCNC: 8.5 MG/DL (ref 8.6–10.4)
CHLORIDE SERPL-SCNC: 105 MMOL/L (ref 98–107)
CO2 SERPL-SCNC: 20 MMOL/L (ref 20–31)
COMPONENT: NORMAL
CREAT SERPL-MCNC: 1.1 MG/DL (ref 0.7–1.2)
EOSINOPHIL # BLD: 0.31 K/UL (ref 0–0.44)
EOSINOPHILS RELATIVE PERCENT: 4 % (ref 1–4)
ERYTHROCYTE [DISTWIDTH] IN BLOOD BY AUTOMATED COUNT: 14.4 % (ref 11.8–14.4)
GFR, ESTIMATED: 65 ML/MIN/1.73M2
GLUCOSE SERPL-MCNC: 88 MG/DL (ref 74–99)
HCT VFR BLD AUTO: 34.6 % (ref 40.7–50.3)
HGB BLD-MCNC: 10.8 G/DL (ref 13–17)
IMM GRANULOCYTES # BLD AUTO: 0.03 K/UL (ref 0–0.3)
IMM GRANULOCYTES NFR BLD: 0 %
LYMPHOCYTES NFR BLD: 1.54 K/UL (ref 1.1–3.7)
LYMPHOCYTES RELATIVE PERCENT: 21 % (ref 24–43)
MAGNESIUM SERPL-MCNC: 2.4 MG/DL (ref 1.7–2.3)
MCH RBC QN AUTO: 29.1 PG (ref 25.2–33.5)
MCHC RBC AUTO-ENTMCNC: 31.2 G/DL (ref 28.4–34.8)
MCV RBC AUTO: 93.3 FL (ref 82.6–102.9)
MONOCYTES NFR BLD: 0.65 K/UL (ref 0.1–1.2)
MONOCYTES NFR BLD: 9 % (ref 3–12)
NEUTROPHILS NFR BLD: 65 % (ref 36–65)
NEUTS SEG NFR BLD: 4.67 K/UL (ref 1.5–8.1)
NRBC BLD-RTO: 0 PER 100 WBC
PLATELET # BLD AUTO: 165 K/UL (ref 138–453)
PMV BLD AUTO: 9.1 FL (ref 8.1–13.5)
POTASSIUM SERPL-SCNC: 4.6 MMOL/L (ref 3.7–5.3)
RBC # BLD AUTO: 3.71 M/UL (ref 4.21–5.77)
SODIUM SERPL-SCNC: 134 MMOL/L (ref 136–145)
TRANSFUSION STATUS: NORMAL
UNIT DIVISION: 0
UNIT ISSUE DATE/TIME: NORMAL
WBC OTHER # BLD: 7.3 K/UL (ref 3.5–11.3)

## 2024-08-07 PROCEDURE — 97535 SELF CARE MNGMENT TRAINING: CPT

## 2024-08-07 PROCEDURE — 82330 ASSAY OF CALCIUM: CPT

## 2024-08-07 PROCEDURE — 92523 SPEECH SOUND LANG COMPREHEN: CPT

## 2024-08-07 PROCEDURE — 85025 COMPLETE CBC W/AUTO DIFF WBC: CPT

## 2024-08-07 PROCEDURE — 97166 OT EVAL MOD COMPLEX 45 MIN: CPT

## 2024-08-07 PROCEDURE — 6370000000 HC RX 637 (ALT 250 FOR IP): Performed by: STUDENT IN AN ORGANIZED HEALTH CARE EDUCATION/TRAINING PROGRAM

## 2024-08-07 PROCEDURE — 99232 SBSQ HOSP IP/OBS MODERATE 35: CPT | Performed by: SURGERY

## 2024-08-07 PROCEDURE — 83735 ASSAY OF MAGNESIUM: CPT

## 2024-08-07 PROCEDURE — 97161 PT EVAL LOW COMPLEX 20 MIN: CPT

## 2024-08-07 PROCEDURE — 36415 COLL VENOUS BLD VENIPUNCTURE: CPT

## 2024-08-07 PROCEDURE — 97116 GAIT TRAINING THERAPY: CPT

## 2024-08-07 PROCEDURE — 6370000000 HC RX 637 (ALT 250 FOR IP): Performed by: NURSE PRACTITIONER

## 2024-08-07 PROCEDURE — 2580000003 HC RX 258: Performed by: STUDENT IN AN ORGANIZED HEALTH CARE EDUCATION/TRAINING PROGRAM

## 2024-08-07 PROCEDURE — 80048 BASIC METABOLIC PNL TOTAL CA: CPT

## 2024-08-07 PROCEDURE — 96127 BRIEF EMOTIONAL/BEHAV ASSMT: CPT | Performed by: SOCIAL WORKER

## 2024-08-07 RX ORDER — LEVETIRACETAM 500 MG/1
500 TABLET ORAL 2 TIMES DAILY
Qty: 12 TABLET | Refills: 0
Start: 2024-08-07 | End: 2024-08-07

## 2024-08-07 RX ORDER — SENNA AND DOCUSATE SODIUM 50; 8.6 MG/1; MG/1
1 TABLET, FILM COATED ORAL 2 TIMES DAILY
Qty: 60 TABLET | Refills: 1
Start: 2024-08-07 | End: 2024-08-07

## 2024-08-07 RX ORDER — SENNA AND DOCUSATE SODIUM 50; 8.6 MG/1; MG/1
1 TABLET, FILM COATED ORAL 2 TIMES DAILY
Qty: 12 TABLET | Refills: 1 | Status: SHIPPED | OUTPATIENT
Start: 2024-08-07 | End: 2024-08-19

## 2024-08-07 RX ORDER — LEVETIRACETAM 500 MG/1
500 TABLET ORAL 2 TIMES DAILY
Qty: 12 TABLET | Refills: 0 | Status: SHIPPED | OUTPATIENT
Start: 2024-08-07 | End: 2024-08-13

## 2024-08-07 RX ORDER — HEPARIN SODIUM 5000 [USP'U]/ML
5000 INJECTION, SOLUTION INTRAVENOUS; SUBCUTANEOUS EVERY 8 HOURS SCHEDULED
Status: DISCONTINUED | OUTPATIENT
Start: 2024-08-07 | End: 2024-08-07 | Stop reason: HOSPADM

## 2024-08-07 RX ADMIN — SENNOSIDES AND DOCUSATE SODIUM 1 TABLET: 50; 8.6 TABLET ORAL at 09:08

## 2024-08-07 RX ADMIN — ATORVASTATIN CALCIUM 20 MG: 20 TABLET, FILM COATED ORAL at 09:08

## 2024-08-07 RX ADMIN — LEVETIRACETAM 500 MG: 500 TABLET, FILM COATED ORAL at 09:08

## 2024-08-07 RX ADMIN — MEMANTINE 5 MG: 5 TABLET ORAL at 09:08

## 2024-08-07 RX ADMIN — SODIUM CHLORIDE, PRESERVATIVE FREE 10 ML: 5 INJECTION INTRAVENOUS at 09:18

## 2024-08-07 ASSESSMENT — PAIN SCALES - GENERAL
PAINLEVEL_OUTOF10: 0
PAINLEVEL_OUTOF10: 0

## 2024-08-07 NOTE — PROGRESS NOTES
Trauma Tertiary Survey    Admit Date: 8/6/2024  Hospital day 1      Subjective:     Patient sitting up to chair. States he wants to eat and go home.     Objective:   Spine:     Spine Tenderness ROM   Cervical 0 /10 Normal   Thoracic 0 /10 Normal   Lumbar 0 /10 Normal     Musculoskeletal    Joint Tenderness Swelling ROM   Right shoulder absent absent normal   Left shoulder absent absent normal   Right elbow absent absent normal   Left elbow absent absent normal   Right wrist absent absent normal   Left wrist absent absent normal   Right hand grasp absent absent normal   Left hand grasp absent absent normal   Right hip absent absent normal   Left hip absent absent normal   Right knee absent absent normal   Left knee absent absent normal   Right ankle absent absent normal   Left ankle absent absent normal   Right foot absent absent normal   Left foot absent absent normal       CONSULTS: NS    PROCEDURES: None     [x] Reviewed radiology reports  (All radiology findings correlate to diagnoses/problem list)    [] Incidental findings: Infrarenal AAA-followup OP   [] Patient/family notified and letter given    Assessment/Plan:     No further imaging

## 2024-08-07 NOTE — CARE COORDINATION
SBIRT-  Me with pt this a.m. was awake and alert  Pt denies any alcohol or drug use.  Pt also denies having any feelings of depression or SI.  Screenings were negative.              Alcohol Screening and Brief Intervention        No results for input(s): \"ALC\" in the last 72 hours.    Alcohol Pre-screening  (MEN ONLY) How many times in the past year have you had 5 or more drinks in a day?: None          Drug Pre-Screening none       Drug Screening DAST       Mood Pre-Screening (PHQ-2)  During the past 2 weeks, have you been bothered by, feeling down, depressed or hopeless?  No        I have interviewed Timothy Cordova, [phone] regarding  His alcohol consumption/drug use and risk for excessive use. Screenings were negative.  Patient  N/A intervention at this time.     Deferred []    Completed on: 8/7/2024   KAREEN SAUCEDA

## 2024-08-07 NOTE — PROGRESS NOTES
Neurosurgery WANG/Resident    Daily Progress Note   Chief Complaint   Patient presents with    Fall     8/7/2024  9:20 AM    Chart reviewed.  No acute events overnight.  No new complaints. He denies headache, vision changes or nausea. He is upset he cannot put on his home clothes.     Vitals:    08/07/24 0500 08/07/24 0600 08/07/24 0700 08/07/24 0800   BP: (!) 150/61 (!) 130/59 (!) 126/51 (!) 110/47   Pulse: 62 54 57 55   Resp: 17 20 21 16   Temp:    98.2 °F (36.8 °C)   TempSrc:    Oral   SpO2: 95% 96% 95% 93%   Weight:  69.4 kg (153 lb 1.6 oz)     Height:             PE:   AOx3   CNII-XII intact, [place]   PERRL, EOMI   Motor   L deltoid 5/5; R deltoid 5/5  L biceps 5/5; R biceps 5/5  L triceps 5/5; R triceps 5/5  L wrist extension 5/5; R wrist extension 5/5  L intrinsics 5/5; R intrinsics 5/5      L iliopsoas 5/5 , R iliopsoas 5/5  L quadriceps 5/5; R quadriceps 5/5  L Dorsiflexion 5/5; R dorsiflexion 5/5  L Plantarflexion 5/5; R plantarflexion 5/5  L EHL 5/5; R EHL 5/5    Sensation intact           Lab Results   Component Value Date    WBC 7.3 08/07/2024    HGB 10.8 (L) 08/07/2024    HCT 34.6 (L) 08/07/2024     08/07/2024     (L) 08/07/2024    K 4.6 08/07/2024     08/07/2024    CREATININE 1.1 08/07/2024    BUN 16 08/07/2024    CO2 20 08/07/2024    TSH 1.64 08/06/2024    INR 1.0 08/06/2024    LABA1C 5.4 08/06/2024       Radiology   CT HEAD WO CONTRAST    Addendum Date: 8/6/2024    ADDENDUM: There is a small left-sided subdural hemorrhage measuring 3 mm in thickness, not seen on prior CT examination.  Findings were discussed with ANJELICA ALVAREZ at 5:09 pm on 8/6/2024.     Result Date: 8/6/2024  EXAMINATION: CT OF THE HEAD WITHOUT CONTRAST  8/6/2024 2:08 pm TECHNIQUE: CT of the head was performed without the administration of intravenous contrast. Automated exposure control, iterative reconstruction, and/or weight based adjustment of the mA/kV was utilized to reduce the radiation dose to as low as  reasonably achievable. COMPARISON: CT scan 06/20/2024 at 9 a.m. HISTORY: ORDERING SYSTEM PROVIDED HISTORY: SAH, SDH, on plavix TECHNOLOGIST PROVIDED HISTORY: SAH, SDH, on plavix FINDINGS: BRAIN/VENTRICLES: There is interval increase in the prominence of a left parafalcine subdural hemorrhage measuring 4 mm in thickness.  Previously noted distinct hemorrhage in the left paramedian sagittal location is stable in size measuring 8 mm.  Hemorrhage in the anterior aspect of the right frontal lobe is stable in size measuring 1.5 cm in size no mass effect or midline shift. The gray-white differentiation is maintained without evidence of an acute infarct. There is prominence of the ventricles and sulci due to global parenchymal volume loss.  There are nonspecific areas of hypoattenuation within the periventricular and subcortical white matter, which likely represent chronic microvascular ischemic change. ORBITS: The visualized portion of the orbits demonstrate no acute abnormality. SINUSES: The visualized paranasal sinuses and mastoid air cells demonstrate no acute abnormality. SOFT TISSUES/SKULL: No acute abnormality of the visualized skull or soft tissues.     1. Interval increase in the prominence of a left parafalcine subdural hemorrhage measuring 4 mm in thickness. 2. Stable hemorrhage in the left paramedian sagittal location measuring 8 mm. 3. Stable hemorrhage in the anterior aspect of the right frontal lobe measuring 1.5 cm in size. 4. No mass effect or midline shift.     CT HEAD WO CONTRAST    Result Date: 8/6/2024  EXAMINATION: CT OF THE HEAD WITHOUT CONTRAST  8/6/2024 8:57 am TECHNIQUE: CT of the head was performed without the administration of intravenous contrast. Automated exposure control, iterative reconstruction, and/or weight based adjustment of the mA/kV was utilized to reduce the radiation dose to as low as reasonably achievable. COMPARISON: No priors HISTORY: ORDERING SYSTEM PROVIDED HISTORY: trauma

## 2024-08-07 NOTE — PROGRESS NOTES
Physical Therapy  Facility/Department: [facility] CAR 1- SICU  Physical Therapy Initial Assessment    Name: Timothy Cordova  : 1931  MRN: [id number]  Date of Service: 2024    Discharge Recommendations:  Further PT is recommended.   PT Equipment Recommendations  Equipment Needed: No  Other: No AD used with amb this date.      Patient Diagnosis(es): The primary encounter diagnosis was Intraparenchymal hemorrhage of brain (HCC). Diagnoses of Subarachnoid hemorrhage (HCC) and Infrarenal abdominal aortic aneurysm (AAA) without rupture (HCC) were also pertinent to this visit.  Past Medical History:  has no past medical history on file.  Past Surgical History:  has a past surgical history that includes Coronary artery bypass graft.    Assessment   Body Structures, Functions, Activity Limitations Requiring Skilled Therapeutic Intervention: Decreased ADL status;Decreased endurance;Decreased strength;Decreased functional mobility ;Decreased balance;Decreased safe awareness  Assessment: Amb 325' no AD CGAx1, Sit-to-stand SUP. Pt presents with decreased balance and strength. Therefore, pt would benefit from continued acute physical therapy to address deficits.  Therapy Prognosis: Good  Decision Making: Low Complexity  Requires PT Follow-Up: Yes  Activity Tolerance  Activity Tolerance: Patient tolerated evaluation without incident     Plan   Physical Therapy Plan  General Plan: 3-5 times per week  Current Treatment Recommendations: Strengthening, Balance training, Functional mobility training, Endurance training, Transfer training, Neuromuscular re-education, Gait training, Home exercise program, Safety education & training, Therapeutic activities, Patient/Caregiver education & training, Equipment evaluation, education, & procurement  Safety Devices  Type of Devices: Gait belt, Call light within reach, Nurse notified, Left in chair, Patient at risk for falls, All fall risk precautions in place, Chair alarm in

## 2024-08-07 NOTE — PROGRESS NOTES
ICU PROGRESS NOTE      PATIENT NAME: Timothy Cordova  MEDICAL RECORD NO. [id number]  DATE: 8/7/2024    HD: # 1    Chief Complaint: \"Fall from standing height in shower, on ASA and plavix \"    SUBJECTIVE    Timothy Cordova is a  89 year old male who presented with a fall from standing height in the shower at his assisted living. Patient is on ASA and plavix for history of CABG. Found to have a right frontal SAH/intraparenchymal hemorrhage and small left SDH. Admitted to the TICU for close monitoring, BIG 3.      8/6/2024: TICU admission, repeat CT head, trend troponin, TEG, repeat CT head with interval changes. Okay with neurosurgery to resume diet.   8/7/2024 likely transfer to step down if okay with neurosurgery.       MEDICAL DECISION MAKING/PLAN  Neuro:  right frontal SAH/intraparenchymal hemorrhage and small left SDH  Neurosurgery consulted   Repeat CT head at 3pm   GCS 15  Resume home memantine  On Keppra    Tylenol for pain, jas for pain   CV  History of CABG, hold ASA and plavix secondary to SDH and SAH  On lisinopril, will hold due to elevated crt, prn hydralazine or labetalol for hypertension   Resume Lipitor   Troponin 39, repeat troponin stable  HR: 50-70  SBP: 120-150  MAP 70-85   Pulm  On room air, oxygen saturation > 92%  GI/Nutrition  Regular diet   ADULT DIET; Regular    Renal/lytes  UOP : 1500cc since admission (1.0cc/kg/hr)   Fluids: none   Crt down trending 1.1 (1.4)     Intake/Output Summary (Last 24 hours) at 8/7/2024 0721  Last data filed at 8/7/2024 0600  Gross per 24 hour   Intake 1675.24 ml   Output 1500 ml   Net 175.24 ml       Recent Labs     08/06/24  0854 08/07/24  0317   * 134*   K 5.0 4.6    105   CO2 22 20   BUN 20 16   CREATININE 1.4* 1.1   MG  --  2.4*     Heme  1 unit platelets given 8/6/2024     Recent Labs     08/06/24  1930 08/07/24  0317   HGB 10.7* 10.8*   HCT 36.2* 34.6*       Endocrine  Recent Labs     08/06/24  0854 08/07/24  0317   GLUCOSE 109* 88  examination.  Findings were discussed with ANJELICA ALVAREZ at 5:09 pm on 8/6/2024.         Final   1. Interval increase in the prominence of a left parafalcine subdural   hemorrhage measuring 4 mm in thickness.   2. Stable hemorrhage in the left paramedian sagittal location measuring 8 mm.   3. Stable hemorrhage in the anterior aspect of the right frontal lobe   measuring 1.5 cm in size.   4. No mass effect or midline shift.         CT HEAD WO CONTRAST   Final Result   Acute intracranial hemorrhage.  There is right frontal   intraparenchymal/subarachnoid hemorrhage and small central focus area of left   paramedian subdural hemorrhage as discussed above.      Left occipital scalp staples.      Findings were discussed with DEEP MAYORGA at 10:15 a.m. on 8/6/2024.         CT THORACIC SPINE BONY RECONSTRUCTION   Final Result   CT CHEST ABDOMEN PELVIS:      1. No acute traumatic injury of the chest, abdomen or pelvis.   2. Bilobed fusiform infrarenal abdominal aortic aneurysm or 2 adjacent   aneurysms. The upper aneurysm measures 3.4 x 3.2 cm and the inferior aneurysm   measures 4.0 x 3.6 cm. There is partial thrombosis of the lumen. Recommend   follow-up every 12 months. Also recommend vascular consultation.   3. Ectasia of the descending thoracic aorta measuring 4 cm diameter with   normal size portion 3 cm diameter. Lumen is partially thrombosed along the   left side.   4. Calcified pleural plaques at lung bases.   5. Minor subsegmental atelectasis lung bases and along the major fissures in   the upper lobes.   CT THORACIC SPINE:      No acute osseous abnormality.      Degenerative changes.      CT LUMBAR SPINE:      No acute osseous abnormality.      Degenerative changes.         CT LUMBAR SPINE BONY RECONSTRUCTION   Final Result   CT CHEST ABDOMEN PELVIS:      1. No acute traumatic injury of the chest, abdomen or pelvis.   2. Bilobed fusiform infrarenal abdominal aortic aneurysm or 2 adjacent   aneurysms. The

## 2024-08-07 NOTE — PLAN OF CARE
Problem: Discharge Planning  Goal: Discharge to home or other facility with appropriate resources  8/7/2024 0141 by Amanda Gay RN  Outcome: Progressing  Flowsheets (Taken 8/6/2024 2000)  Discharge to home or other facility with appropriate resources:   Identify barriers to discharge with patient and caregiver   Identify discharge learning needs (meds, wound care, etc)  8/6/2024 1201 by Verna Tienrey RN  Outcome: Progressing     Problem: Safety - Adult  Goal: Free from fall injury  8/7/2024 0141 by Amanda Gay RN  Outcome: Progressing  8/6/2024 1201 by Verna Tierney RN  Outcome: Progressing     Problem: ABCDS Injury Assessment  Goal: Absence of physical injury  8/7/2024 0141 by Amanda Gay RN  Outcome: Progressing  8/6/2024 1201 by Verna Tierney RN  Outcome: Progressing     Problem: Skin/Tissue Integrity  Goal: Absence of new skin breakdown  Description: 1.  Monitor for areas of redness and/or skin breakdown  2.  Assess vascular access sites hourly  3.  Every 4-6 hours minimum:  Change oxygen saturation probe site  4.  Every 4-6 hours:  If on nasal continuous positive airway pressure, respiratory therapy assess nares and determine need for appliance change or resting period.  8/7/2024 0141 by Amanda Gay RN  Outcome: Progressing  8/6/2024 1201 by Verna Tierney RN  Outcome: Progressing     Problem: Pain  Goal: Verbalizes/displays adequate comfort level or baseline comfort level  Outcome: Progressing  Flowsheets (Taken 8/6/2024 2000)  Verbalizes/displays adequate comfort level or baseline comfort level:   Encourage patient to monitor pain and request assistance   Assess pain using appropriate pain scale   Administer analgesics based on type and severity of pain and evaluate response   Implement non-pharmacological measures as appropriate and evaluate response   Consider cultural and social influences on pain and pain management   Notify Licensed Independent Practitioner if

## 2024-08-07 NOTE — CARE COORDINATION
Transitional Planning  Informed pt would be discharged today  Called [facility] [phone] spoke with Sandra informed her pt would be discharge back today.  PT OT recommend continued outpt services Mikayla states they will need an outpt order for PT OT informed attending services   11:55am  Called [facility] to get fax number to fax discharge instructions.  Also asked family would like to transport pt and they will have help from nurse getting pt into car but will need facility to help get him out of the car.  She will call me back once she verifies they can assist him out of the car upon return.     12:11  Updated family at bedside on the above they will go get lunch and bring car over to [facility] side.  Updated RN    14;37  Faxed AVS  and PT OT orders to [facility] [phone]      Discharge Report    [facility]  Clinical Case Management Department  Written by: Lexy Francisco RN    Patient Name: Timothy Cordova  Attending Provider: Sandra Whittaker*  Admit Date: 2024  8:39 AM  MRN: [id number]  Account: [id number]                     : 1931  Discharge Date: 2024      Disposition: home  [facility] transportation provided by family    Lexy Francisco RN

## 2024-08-07 NOTE — DISCHARGE SUMMARY
portion 3 cm diameter. Lumen is partially thrombosed along the left side. 4. Calcified pleural plaques at lung bases. 5. Minor subsegmental atelectasis lung bases and along the major fissures in the upper lobes. CT THORACIC SPINE: No acute osseous abnormality. Degenerative changes. CT LUMBAR SPINE: No acute osseous abnormality. Degenerative changes.       DISCHARGE INSTRUCTIONS     Discharge Medications:        Medication List        START taking these medications      levETIRAcetam 500 MG tablet  Commonly known as: KEPPRA  Take 1 tablet by mouth 2 times daily for 6 days     sennosides-docusate sodium 8.6-50 MG tablet  Commonly known as: SENOKOT-S  Take 1 tablet by mouth 2 times daily            CONTINUE taking these medications      acetaminophen 500 MG tablet  Commonly known as: TYLENOL     atorvastatin 20 MG tablet  Commonly known as: LIPITOR     Bacid Caps     dextromethorphan 30 MG/5ML extended release liquid  Commonly known as: DELSYM     diclofenac sodium 1 % Gel  Commonly known as: VOLTAREN     ferrous sulfate 325 (65 Fe) MG tablet  Commonly known as: IRON 325     ibuprofen 200 MG tablet  Commonly known as: ADVIL;MOTRIN     lisinopril 10 MG tablet  Commonly known as: PRINIVIL;ZESTRIL     loratadine 10 MG tablet  Commonly known as: CLARITIN     magnesium oxide 400 (240 Mg) MG tablet  Commonly known as: MAG-OX     memantine 5 MG tablet  Commonly known as: NAMENDA     nystatin 201809 UNIT/GM cream  Commonly known as: MYCOSTATIN     olopatadine 0.1 % ophthalmic solution  Commonly known as: PATANOL     sodium chloride 0.65 % nasal spray  Commonly known as: OCEAN, BABY AYR     tolterodine 4 MG extended release capsule  Commonly known as: DETROL LA     traZODone 50 MG tablet  Commonly known as: DESYREL     white petrolatum Oint ointment            STOP taking these medications      clopidogrel 75 MG tablet  Commonly known as: PLAVIX               Where to Get Your Medications        Information about where to get  these medications is not yet available    Ask your nurse or doctor about these medications  levETIRAcetam 500 MG tablet  sennosides-docusate sodium 8.6-50 MG tablet       Diet: ADULT DIET; Regular diet as tolerated  Activity: As instructed WEIGHT BEARING STATUS: Weight bearing as tolerated  Wound Care: Daily and as needed.    DISPOSITION: Assisted Living    Follow-up:  Efrain Gallegos MD  [address] [address] [phone]    Follow up in 1 month(s)  Follow up 1 month with repeat head CT. Okay to resume aspirin on 8/7. Please hold Plavix until 8/11.    Charlotte Booth MD  [address] [address] [id number]  [facility] [address] [phone]    Schedule an appointment as soon as possible for a visit  Infrarenal AAA        SIGNED:  KAVITA Kenney CNP   8/7/2024, 12:57 PM  Time Spent for discharge: 35 minutes

## 2024-08-07 NOTE — PROGRESS NOTES
SLP ALL NOTES  Facility/Department: [facility] CAR 1- SICU  Initial Speech/Language/Cognitive Assessment    NAME: Timothy Cordova  : 1931   MRN: [id number]  ADMISSION DATE: 2024  ADMITTING DIAGNOSIS: has SAH (subarachnoid hemorrhage) (HCC) and Intraparenchymal hemorrhage of brain (HCC) on their problem list.    Date of Eval: 2024   Evaluating Therapist: CHARLENE BALLARD    Primary Complaint: Timothy Cordova is a 89 year old male who presented with a fall from standing height in the shower at his assisted living. Patient is on ASA and plavix for history of CABG. Found to have a right frontal SAH/intraparenchymal hemorrhage and small left SDH. Admitted to the TICU for close monitoring, BIG 3.     Pain:  Pain Assessment  Pain Assessment: None - Denies Pain  Pain Level: 0  Patient's Stated Pain Goal: 0 - No pain  Non-Pharmaceutical Pain Intervention(s): Repositioned, Rest, Emotional support    Vision/ Hearing  Vision  Vision: Impaired  Hearing  Hearing: Within functional limits    Assessment:  Pt presents with moderate-severe cognitive deficits characterized by difficulties with immediate recall of 5 units, short term recall of 3 units, abstract reasoning and deductive reasoning.   Pt. Presents with no dysarthria, no O/M deficits at this time. ST to follow up and provide treatment to address noted deficits. Education provided.      Recommendations:  Recommendations  Requires SLP Intervention: Yes  Patient Education: yes  Patient Education Response: Verbalizes understanding  D/C Recommendations: Ongoing speech therapy is recommended during this hospitalization  Frequency: 3-5 x week    Plan:   Speech Therapy Prognosis  Prognosis: Fair  Individuals consulted  Consulted and agree with results and recommendations: Patient    Goals:  Short Term Goals  Goal 1: Pt. will recall 3-5 units with and without distractions with 90% accuracy.  Goal 2: Pt. will utilize memory compensatory strategies to aid in recall.  Goal 3:  Pt. will complete abstract and deductive reasoning tasks with 90% accuracy.   Patient/family involved in developing goals and treatment plan: yes    Subjective:     Social/Functional History  Type of Home: Facility  Active : No  Vision  Vision: Impaired  Hearing  Hearing: Within functional limits           Objective:       Oral Motor   Labial: No impairment  Lingual: No impairment    Motor Speech  Apraxic Characteristics: None  Dysarthric Characteristics: None  Intelligibility: No impairment  Overall Impairment Severity: None    Expression  Primary Mode of Expression: Verbal      Cognition:      Orientation  Overall Orientation Status: Within Normal Limits  Attention  Attention: Within Functional Limits  Memory  Memory: Exceptions to [facility]  Short-term Memory: Severe (0/3 increased to 3/3 with min-mod verbal cues, 0/3)  Immediate Memory: Moderate (3/3, 3/3, 0/5 increased to 4/5 with repetition)  Problem Solving  Problem Solving: Exceptions to [facility]  Verbal Reasoning Skills: Moderate-Severe (Inductive Reasonin/4, Similarities/Differences: 2/4, Deductive Reasonin/3)  Sequencing: [facility]  Abstract Reasoning  Abstract Reasoning: Within Functional Limits  Divergent Thinking: [facility]  Safety/Judgment  Safety/Judgment: Exceptions to [facility]  Insight: Moderate   (1/3)  Flexibility of Thought: Moderate (1/3)    Prognosis:  Speech Therapy Prognosis  Prognosis: Fair  Individuals consulted  Consulted and agree with results and recommendations: Patient    Education:  Patient Education: yes  Patient Education Response: Verbalizes understanding          Therapy Time:   Individual Concurrent Group Co-treatment   Time In  920         Time Out  931         Minutes  11                 Electronically signed by TORO TROY M.A. CCC-SLP on 2024 at 11:23 AM

## 2024-08-07 NOTE — DISCHARGE INSTRUCTIONS
Head Injury Discharge Instructions     Thank you for choosing [facility] and [facility] for your recovery needs. The following instructions will help to ensure your comfort and that you are well prepared for your recovery.     Follow-up Visit:   The office is located at:   [address] [place], [place] [id number]      [x]  Please have a CT scan of your head done prior to this appointment.   Please also call your primary care physician to schedule an appointment for further evaluation and care.       Diet:   You may resume your regular diet as tolerated.     Activity:   You should not be left alone. Have a relative or friend stay with you until they think you are back to normal.   Do not drive or operate machinery until you are seen in the office.   Avoid strenuous activities. No lifting or straining.     Pain Management:  You may be given a prescription for pain medication.   Try not to take the pain medicine unless you need to. If you feel that you do not need something that strong, you may use regular or extra-strength Tylenol instead.   DO NOT drink alcohol, drive or operate heavy machinery while taking your pain medications.     YOU SHOULD CALL THE OFFICE AT IF YOU HAVE ANY OF THE FOLLOWING:   Worsening headaches or headaches that feel different.   Persistent nausea and/or vomiting.   Changes in mental status such as confusion, slurred speech, increased sleepiness.   Any new neurologic sensory or motor deficits (weakness, numbness)   Seizures   Loss of memory.   Dizziness or fainting.   Trouble walking or staggering.   Blurry vision, double vision or other problems with your eyesight.   Bleeding or clear liquid drainage from your ears or nose.   Very sleepy (more than expected) or hard to wake up.   Unusual sounds in the ear.   Any new or increased symptoms.     *If you are unable to contact someone at the office and your symptoms persist or increase,

## 2024-08-07 NOTE — CONSULTS
[facility] Inpatient Brief Diagnostic Assessment Note  SAMY MCDONALD LENNY-S   8/7/2024    Timothy Cordova  2/12/1931  [id number]      Time Spent with Patient: 15 minutes or less (Brief Diagnostic Assessment) 10902    Pt was provided informed consent for the [facility]. Discussed with patient model of service to include the limits of confidentiality (i.e. abuse reporting, suicide intervention, etc.) and short-term intervention focused approach.  Pt indicated understanding.    [facility] Inpatient or Virtual Question: This was not a virtual session    Is consult a Victim of Crime?: No    Presenting Patient Report:  Patient was screened at the request of the Trauma Team.   Patient presents as a 89 y.o. male subsequent to hospital admission following Fall resulting in injury.     Patient was able to complete the following screens: ITSS    MSE:     Appearance:   Hospital Gown, Well Groomed, Good Hygiene, and Good Eye Contact  Speech:    spontaneous, normal rate, normal volume, and well-articulated  Affect Observed:   Appropriate to Context  Thought Content:    intact  Thought Process:    linear, goal directed, and coherent  Associations:    logical connections  Insight:    Fair  Judgment:    Intact  Orientation:    oriented to person, place, time, and general circumstances    Patient reports and/or exhibits the following symptoms:    Mood: Appropriate to Context    Cognitive symptoms: Appropriate to Context    Behaviors: Appropriate to Context    Somatic: None Reported      Assessment:  Patient denies any previous or current symptoms for depression or anxiety.  Patient scored low on Injured Trauma Survivor Screen (ITSS).  Patient does not meet criteria for depression or anxiety diagnosis at this time.     Screening Scale Results (If Any):    ITSS:  Date Screen Completed: 08/07/2024  Patient's score= 0 for PTSD risk. ( ? 2 is positive for PTSD risk. )  Patient's score= 0 for depression risk.  ( ? 2 is  positive for Depression risk )    Diagnoses: The following Diagnoses are based on currently available information and may change as additional information becomes available.  Observation for suspected mental condition, No diagnosis (G30.39) 510128    Patient Interventions:  Brief Diagnostic Assessment     Recommendations:  No outpatient mental health services recommended    Plan:  No plan for bedside follow-up during hospital admission

## 2024-08-07 NOTE — PROGRESS NOTES
Occupational Therapy  Facility/Department: [facility] CAR 1- SICU  Occupational Therapy Initial Assessment    Name: Timothy Cordova  : 1931  MRN: [id number]  Date of Service: 2024  Chief Complaint   Patient presents with    Fall     Discharge Recommendations:  Patient would benefit from continued therapy after discharge  OT Equipment Recommendations  Equipment Needed: Yes  Mobility Devices: ADL Assistive Devices  ADL Assistive Devices: Shower Chair with back     Patient Diagnosis(es): The primary encounter diagnosis was Intraparenchymal hemorrhage of brain (HCC). Diagnoses of Subarachnoid hemorrhage (HCC) and Infrarenal abdominal aortic aneurysm (AAA) without rupture (HCC) were also pertinent to this visit.  Past Medical History:  has no past medical history on file.  Past Surgical History:  has a past surgical history that includes Coronary artery bypass graft.         Assessment   Performance deficits / Impairments: Decreased functional mobility ;Decreased safe awareness;Decreased balance;Decreased endurance;Decreased ADL status;Decreased cognition  Assessment: Patient completed dressing tasks seated EOB participating in LB dressing and UB Dressing tasks, requiring Min A for socks and for balance to complete clothing mgmt fastening pants. Pt completed functional transfers at CGA from EOB. Pt required education on safety awareness for fall prevention with poor return. Therapist set up RW for patient to utilize for mobility from EOB > chair, pushing out of the way, completing without device at CGA. Pt retired completing self-feeding independently. Patient would benefit from continued acute OT services to address functional deficits through skilled intervention to promote independence and safety with ADL/IADLs and functional transfers/mobility for safe return to prior living environment and level of function.  Prognosis: Good  Decision Making: Medium Complexity  REQUIRES OT FOLLOW-UP: Yes  Activity

## 2024-08-07 NOTE — DISCHARGE INSTR - COC
Continuity of Care Form    Patient Name: Timothy Cordova   :  1931  MRN:  [id number]    Admit date:  2024  Discharge date:  ***    Code Status Order: Full Code   Advance Directives:     Admitting Physician:  Sandra Whittaker MD  PCP: Shayan Purdy MD    Discharging Nurse: ***  Discharging Hospital Unit/Room#: 1021/1021-01  Discharging Unit Phone Number: ***    Emergency Contact:   Extended Emergency Contact Information  Primary Emergency Contact: Elysia Ferro  Home Phone: [phone]  Relation: Child  Secondary Emergency Contact: Rica Beltran  Home Phone: [phone]  Relation: Child    Past Surgical History:  Past Surgical History:   Procedure Laterality Date    CORONARY ARTERY BYPASS GRAFT      about 20 years ago       Immunization History:     There is no immunization history on file for this patient.    Active Problems:  Patient Active Problem List   Diagnosis Code    SAH (subarachnoid hemorrhage) ([facility]) I60.9    Intraparenchymal hemorrhage of brain ([facility]) I61.9       Isolation/Infection:   Isolation            No Isolation          Patient Infection Status       None to display            Nurse Assessment:  Last Vital Signs: BP (!) 100/57   Pulse 55   Temp 98.4 °F (36.9 °C) (Oral)   Resp 16   Ht 1.727 m (5' 8\")   Wt 69.4 kg (153 lb 1.6 oz)   SpO2 94%   BMI 23.28 kg/m²     Last documented pain score (0-10 scale): Pain Level: 0  Last Weight:   Wt Readings from Last 1 Encounters:   24 69.4 kg (153 lb 1.6 oz)     Mental Status:  {IP PT MENTAL STATUS:49319}    IV Access:  { NASIMA IV ACCESS:308090910}    Nursing Mobility/ADLs:  Walking   {CHP DME ADLs:736028120}  Transfer  {CHP DME ADLs:030173447}  Bathing  {CHP DME ADLs:577537372}  Dressing  {CHP DME ADLs:456205277}  Toileting  {CHP DME ADLs:873881770}  Feeding  {CHP DME ADLs:407163265}  Med Admin  {CHP DME ADLs:451713876}  Med Delivery   { NASIMA MED Delivery:172848101}    Wound Care Documentation and Therapy:  Incision 24  Head Left;Lower;Posterior (Active)   Dressing Status Other (Comment) 24 08   Dressing/Treatment Open to air 24 08   Closure Staples 24 08   Margins Approximated 24 08   Incision Assessment Dry 24 08   Drainage Amount None (dry) 24 08   Odor None 24 08   Ann-incision Assessment Dry/flaky 24 08   Number of days: 0        Elimination:  Continence:   Bowel: {YES / NO:}  Bladder: {YES / NO:}  Urinary Catheter: {Urinary Catheter:039003834}   Colostomy/Ileostomy/Ileal Conduit: {YES / NO:}       Date of Last BM: ***    Intake/Output Summary (Last 24 hours) at 2024 1314  Last data filed at 2024 1000  Gross per 24 hour   Intake 1500.24 ml   Output 1125 ml   Net 375.24 ml     I/O last 3 completed shifts:  In: 1675.2 [I.V.:1175.7; Blood:499.5]  Out: 1500 [Urine:1500]    Safety Concerns:     { NASIMA Safety Concerns:900028641}    Impairments/Disabilities:      { NASIMA Impairments/Disabilities:325064297}    Nutrition Therapy:  Current Nutrition Therapy:   { NASIMA Diet List:881822531}    Routes of Feeding: {P DME Other Feedings:132203986}  Liquids: {Slp liquid thickness:94039}  Daily Fluid Restriction: {CHP DME Yes amt example:001530904}  Last Modified Barium Swallow with Video (Video Swallowing Test): {Done Not Done Date:}    Treatments at the Time of Hospital Discharge:   Respiratory Treatments: ***  Oxygen Therapy:  {Therapy; copd oxygen:32183}  Ventilator:    { CC Vent List:224130888}    Rehab Therapies: {THERAPEUTIC INTERVENTION:6074227992}  Weight Bearing Status/Restrictions: {[facility] Weight Bearin}  Other Medical Equipment (for information only, NOT a DME order):  {EQUIPMENT:718982010}  Other Treatments: ***    Patient's personal belongings (please select all that are sent with patient):  {[facility] DME Belongings:832675648}    RN SIGNATURE:  {Esignature:279758328}    CASE MANAGEMENT/SOCIAL WORK SECTION    Inpatient Status

## 2024-08-07 NOTE — PROGRESS NOTES
15 Variable Trauma-Specific Frailty Index   Comorbidities   Cancer History Yes (1) No (0)   Coronary Heart Disease MI (1) CABG (0.75) Mild (0.25)  No (0)   Dementia Severe (1) Moderate (0.5) Mild (0.25)  No (0)   Daily Activities   Help With Grooming Yes (1) No (0)   Help with Managing Money Yes (1) No (0)   Help doing Housework Yes (1) No (0)   Help with Toileting Yes (1) No (0)   Help Walking Wheelchair (1) Walker (0.75) Cane (0.5) No (0)   Health Attitude   Feel Less Useful Most Time (1) Sometimes (0.5) Never (0)   Feel Sad Most Time (1) Sometimes (0.5) Never (0)   Feel Effort to Do Everything Most Time (1) Sometimes (0.5) Never (0)   Feels Lonely Most Time (1) Sometimes (0.5) Never (0)   Falls Most Time (1) Sometimes (0.5) Never (0)   Function   Sexually Active Yes (0)  No(1)   Nutrition   Albumin < 3g/dL (1)  > 3g/dL   Scoring   Score   FI (Score/15)   > 0.25 = Frail   *Based on 2-weeks prior to hospital admission   Trauma Specific Fraility Index > or = to 4 (4/15 = 0.26)  Trauma Specific Fraility Index Score:    0.23

## 2024-08-08 NOTE — PROGRESS NOTES
[facility] ([facility]) Inpatient Discharge Summary  ANDREA LAYTON  8/8/2024        Timothy Cordova  2/12/1931  [id number]    Date & Time of Discharge: 8/7/2024  3:13 PM     Is consult a Victim of Crime?: No    Services provided:  Screenings: ITSS    Screen Results (If any):      ITSS:  Date Screen Completed: 08/07/2024  Patient's score= 0 for PTSD risk. ( ? 2 is positive for PTSD risk. )  Patient's score= 0 for depression risk.  ( ? 2 is positive for Depression risk )      Discharge Recommendations:  No outpatient mental health services recommended      The therapist may be reached through the [facility] offices at [phone].

## 2024-09-11 ENCOUNTER — OFFICE VISIT (OUTPATIENT)
Age: 89
End: 2024-09-11
Payer: MEDICARE

## 2024-09-11 VITALS
DIASTOLIC BLOOD PRESSURE: 70 MMHG | BODY MASS INDEX: 24.25 KG/M2 | HEART RATE: 65 BPM | SYSTOLIC BLOOD PRESSURE: 131 MMHG | WEIGHT: 160 LBS | HEIGHT: 68 IN

## 2024-09-11 DIAGNOSIS — I60.9 SAH (SUBARACHNOID HEMORRHAGE) (HCC): Primary | ICD-10-CM

## 2024-09-11 PROCEDURE — 99214 OFFICE O/P EST MOD 30 MIN: CPT | Performed by: PHYSICIAN ASSISTANT

## 2024-09-11 PROCEDURE — G8420 CALC BMI NORM PARAMETERS: HCPCS | Performed by: PHYSICIAN ASSISTANT

## 2024-09-11 PROCEDURE — 1036F TOBACCO NON-USER: CPT | Performed by: PHYSICIAN ASSISTANT

## 2024-09-11 PROCEDURE — 1123F ACP DISCUSS/DSCN MKR DOCD: CPT | Performed by: PHYSICIAN ASSISTANT

## 2024-09-11 PROCEDURE — G8427 DOCREV CUR MEDS BY ELIG CLIN: HCPCS | Performed by: PHYSICIAN ASSISTANT
